# Patient Record
Sex: FEMALE | Race: WHITE | NOT HISPANIC OR LATINO | URBAN - METROPOLITAN AREA
[De-identification: names, ages, dates, MRNs, and addresses within clinical notes are randomized per-mention and may not be internally consistent; named-entity substitution may affect disease eponyms.]

---

## 2021-11-15 LAB
CYTOLOGY CVX/VAG DOC THIN PREP: NORMAL
HPV16+18+45 E6+E7MRNA CVX NAA+PROBE: NEGATIVE

## 2023-06-21 PROBLEM — Z86.73 HISTORY OF TRANSIENT ISCHEMIC ATTACK (TIA): Status: ACTIVE | Noted: 2023-06-21

## 2023-09-26 ENCOUNTER — OFFICE VISIT (OUTPATIENT)
Dept: NEUROLOGY | Facility: CLINIC | Age: 38
End: 2023-09-26
Payer: COMMERCIAL

## 2023-09-26 VITALS
BODY MASS INDEX: 24.34 KG/M2 | DIASTOLIC BLOOD PRESSURE: 62 MMHG | SYSTOLIC BLOOD PRESSURE: 98 MMHG | WEIGHT: 170 LBS | HEART RATE: 61 BPM | HEIGHT: 70 IN | OXYGEN SATURATION: 95 %

## 2023-09-26 DIAGNOSIS — Z86.73 HISTORY OF CEREBRAL INFARCTION: Primary | ICD-10-CM

## 2023-09-26 DIAGNOSIS — G43.109 MIGRAINE WITH AURA AND WITHOUT STATUS MIGRAINOSUS, NOT INTRACTABLE: ICD-10-CM

## 2023-09-26 RX ORDER — DIPHENOXYLATE HYDROCHLORIDE AND ATROPINE SULFATE 2.5; .025 MG/1; MG/1
TABLET ORAL DAILY
COMMUNITY

## 2023-09-26 NOTE — PROGRESS NOTES
Chief Complaint   Patient presents with    Transient Ischemic Attack       Patient ID: Beryl Pro is a 38 y.o. female.    HPI:    Ms. Beryl Pro is a 38-year-old female who was referred by HODA Dunn, for history of transient ischemic attack. She also has seen Dr. Caballero in neurology in the past. She has a history of TIA with no residual deficits in 2020. She was referred to hematology and oncology for full evaluation for causes of no significant findings. It was determined it was likely birth control and could have been ocular migraine and she has ocular migraine occurrences twice yearly. She is on topiramate 50 mg daily, but it was discontinued in the past due to concerns for weight loss. She has a small chronic lacunar infarct in her right thalamus. She has a normal factor V Leiden, normal homocysteine, normal antithrombin III. Cardiac echo with bubble study was normal with no evidence of PFO. CTA of the head and neck was normal except for non-visualization of the upper left vertebral artery. Her MRI of the brain in 06/2020 showed an acute ischemic stroke in the right thalamus, so it was not a TIA, it was a small stroke. Her LDL was 140 mg/dL at one point. Xarelto was prescribed in the past. She takes aspirin 81 mg daily. She takes atorvastatin 20 mg. She has had a history of COVID-19. I do not see data about a cardiac event monitor. She had an A1c done which was low 4.6 percent. Her LDL was 61 mg/dL which is at goal of less than 70 mg/dL.    The patient reports that she had a small stroke in 2020. She states that she had full numbness on the left side of her body. She notes that she has a history of migraines following a concussion and she thought it was the start of one of her migraines. She notes that she had floaters in her eyes at the time. She states that she does not get them much anymore because of the medicine she is on currently. She states that she felt nauseous throughout that day.  "She notes that she was walking her dog, and it happened suddenly. She denies any weakness. She states that she worked from home during the COVID-19 pandemic. She reports that she went to urgent care, and she kept trying to tell them that there was something wrong, but they kept telling her that it was a new migraine she was having. She notes that she was told to go to the emergency room if she still felt weird the next day. She states that she had no visual symptoms. She notes that the numbness lasted through that first day; however, she states that she had some lingering numbness into the second day when she ended up going into the emergency room that afternoon. She notes that she did a presentation, and then went in. She states that it has resolved completely. She notes that the her pinkie fingers, at the tips, will feel numb. She states that it happens once every 3 months. She notes that she has always had really bad circulation. She confirms that she did not have a heart monitor done. She notes that they attributed it to a \"perfect storm\" of a migraine and being on birth control. She denies any stroke symptoms since then. She denies any facial droop, weakness, speech changes, or numbness.     She states that she is still taking aspirin 81 mg daily. She notes that she has infrequent migraines once or twice a year. She states that she has not taken any Excedrin. She is very hesitant to take anything. She states that being on the topiramate has helped her migraines. She denies any unbearable side effects. She states that it has leveled off for concerns about her losing too much weight. She notes that she will track it closely. She states that at the end of the day, it is calories out, and sometimes it causes not like true nausea, but it does change appetite in some ways. She states that some weeks the appetite is not as much there, although several weeks where it is normal. She states that she is happy with her " migraine regimen currently. She denies any trouble walking.    The patient reports that she is right-handed. She denies any family history of immune disease. She states that she is unsure if they checked anything called an antiphospholipid antibody. She notes that she saw Dr. Spencer at [facility]. She states that she was off the Xarelto when she started temporarily. She notes that she did not like Xarelto. She states that she has a history of bruising before the stroke. She notes that she was on both Xarelto and aspirin, and it was worse. She states that once they did the blood work, she was told that she did not need to come off it. She notes that they put her on the lower dosage of Xarelto.    The patient reports that she does not have any children. She states that she has started discussing that possibility, and she would like to know what her risk factors mean for her.     The patient reports that she moved to [place] in 11/2022. She states that she works at the [employer].     The following portions of the patient's history were reviewed and updated as appropriate: allergies, current medications, past family history, past medical history, past social history, past surgical history and problem list.    Review of Systems   Neurological:  Positive for light-headedness. Negative for dizziness, tremors, seizures, syncope, facial asymmetry, speech difficulty, weakness, numbness and headaches.   Psychiatric/Behavioral:  Negative for agitation, behavioral problems, confusion, decreased concentration, dysphoric mood, hallucinations, self-injury, sleep disturbance and suicidal ideas. The patient is not nervous/anxious and is not hyperactive.         Vitals:    09/26/23 0803   BP: 98/62   Pulse: 61   SpO2: 95%       Neurologic Exam     Mental Status   Speech: speech is normal   Level of consciousness: alert    Cranial Nerves     CN II   Visual fields full to confrontation.     CN III, IV, VI   Pupils are equal, round,  and reactive to light.  Extraocular motions are normal.     CN V   Facial sensation intact.     CN VII   Facial expression full, symmetric.     CN VIII   Hearing: intact    CN IX, X   Palate: symmetric    CN XI   Right trapezius strength: normal  Left trapezius strength: normal    CN XII   Tongue: not atrophic  Fasciculations: absent  Tongue deviation: none    Motor Exam   Muscle bulk: normal    Strength   Right deltoid: 5/5  Left deltoid: 5/5  Right biceps: 5/5  Left biceps: 5/5  Right triceps: 5/5  Left triceps: 5/5  Right iliopsoas: 5/5  Left iliopsoas: 5/5  Right quadriceps: 5/5  Left quadriceps: 5/5  Right hamstrin/5  Left hamstrin/5  Right anterior tibial: 5/5  Left anterior tibial: 5/5  Right gastroc: 5/5  Left gastroc: 5/5Grip 5 out of 5 bilaterally     Sensory Exam   Right arm light touch: normal  Left arm light touch: normal  Right leg light touch: normal  Left leg light touch: normal    Gait, Coordination, and Reflexes     Coordination   Finger to nose coordination: normal  Heel to shin coordination: normalNormal unstressed gait     Physical Exam  Eyes:      Extraocular Movements: EOM normal.      Pupils: Pupils are equal, round, and reactive to light.   Neurological:      Coordination: Finger-Nose-Finger Test and Heel to Shin Test normal.   Psychiatric:         Speech: Speech normal.     Procedures    Assessment/Plan:         Diagnoses and all orders for this visit:    1. History of cerebral infarction (Primary)  -     Holter Monitor - 72 Hour Up To 15 Days; Future    2. Migraine with aura and without status migrainosus, not intractable       The patient will complete a cardiac monitor study. She will take ASA 81mg and atorvastatin 20 mg daily. She will start taking riboflavin 400 mg daily 2 days after she discontinues the topiramate. She will continue taking Tylenol as needed. She will establish care with a high-risk OB.    I spent approximately 30 minutes in the care of this patient today. She  has a history of stroke and a history of migraine. I ordered a Holter monitor for further stroke work up.     I do not think that she has ocular migraines. I think that she has migraines with aura, and she has also had a history of stroke. She is doing well on topiramate but is thinking about trying for pregnancy. I discussed with topiramate; I do not recommend that she takes topiramate when she is trying to conceive. She should stop taking it if she decides to try and then can start trying for pregnancy 2 days afterwards. She can start riboflavin 400 mg daily as a preventative and use Tylenol as an abortive for her migraines when she has started to try for pregnancy.    She will follow up with me in 6 months.        Transcribed from ambient dictation for Cecil Pepe MD by Ni aSnderson.  09/26/23   11:11 EDT    Patient or patient representative verbalized consent to the visit recording.  I have personally performed the services described in this document as transcribed by the above individual, and it is both accurate and complete.  Cecil Pepe MD  10/13/2023  09:45 EDT

## 2023-09-26 NOTE — LETTER
October 13, 2023     HODA Dunn  [address]  [address] [address]    Patient: Beryl Pro   YOB: 1985   Date of Visit: 9/26/2023     Dear HODA Dunn:       Thank you for referring Beryl Pro to me for evaluation. Below are the relevant portions of my assessment and plan of care.    If you have questions, please do not hesitate to call me. I look forward to following Beryl along with you.         Sincerely,        Cecil Pepe MD        CC: No Recipients    Cecil Pepe MD  10/13/23 0948  Sign when Signing Visit  Chief Complaint   Patient presents with    Transient Ischemic Attack       Patient ID: Beryl Pro is a 38 y.o. female.    HPI:    Ms. Beryl Pro is a 38-year-old female who was referred by HODA Dunn, for history of transient ischemic attack. She also has seen Dr. Caballero in neurology in the past. She has a history of TIA with no residual deficits in 2020. She was referred to hematology and oncology for full evaluation for causes of no significant findings. It was determined it was likely birth control and could have been ocular migraine and she has ocular migraine occurrences twice yearly. She is on topiramate 50 mg daily, but it was discontinued in the past due to concerns for weight loss. She has a small chronic lacunar infarct in her right thalamus. She has a normal factor V Leiden, normal homocysteine, normal antithrombin III. Cardiac echo with bubble study was normal with no evidence of PFO. CTA of the head and neck was normal except for non-visualization of the upper left vertebral artery. Her MRI of the brain in 06/2020 showed an acute ischemic stroke in the right thalamus, so it was not a TIA, it was a small stroke. Her LDL was 140 mg/dL at one point. Xarelto was prescribed in the past. She takes aspirin 81 mg daily. She takes atorvastatin 20 mg. She has had a history of COVID-19. I do not see data about a cardiac event  "monitor. She had an A1c done which was low 4.6 percent. Her LDL was 61 mg/dL which is at goal of less than 70 mg/dL.    The patient reports that she had a small stroke in 2020. She states that she had full numbness on the left side of her body. She notes that she has a history of migraines following a concussion and she thought it was the start of one of her migraines. She notes that she had floaters in her eyes at the time. She states that she does not get them much anymore because of the medicine she is on currently. She states that she felt nauseous throughout that day. She notes that she was walking her dog, and it happened suddenly. She denies any weakness. She states that she worked from home during the COVID-19 pandemic. She reports that she went to urgent care, and she kept trying to tell them that there was something wrong, but they kept telling her that it was a new migraine she was having. She notes that she was told to go to the emergency room if she still felt weird the next day. She states that she had no visual symptoms. She notes that the numbness lasted through that first day; however, she states that she had some lingering numbness into the second day when she ended up going into the emergency room that afternoon. She notes that she did a presentation, and then went in. She states that it has resolved completely. She notes that the her pinkie fingers, at the tips, will feel numb. She states that it happens once every 3 months. She notes that she has always had really bad circulation. She confirms that she did not have a heart monitor done. She notes that they attributed it to a \"perfect storm\" of a migraine and being on birth control. She denies any stroke symptoms since then. She denies any facial droop, weakness, speech changes, or numbness.     She states that she is still taking aspirin 81 mg daily. She notes that she has infrequent migraines once or twice a year. She states that she has not " taken any Excedrin. She is very hesitant to take anything. She states that being on the topiramate has helped her migraines. She denies any unbearable side effects. She states that it has leveled off for concerns about her losing too much weight. She notes that she will track it closely. She states that at the end of the day, it is calories out, and sometimes it causes not like true nausea, but it does change appetite in some ways. She states that some weeks the appetite is not as much there, although several weeks where it is normal. She states that she is happy with her migraine regimen currently. She denies any trouble walking.    The patient reports that she is right-handed. She denies any family history of immune disease. She states that she is unsure if they checked anything called an antiphospholipid antibody. She notes that she saw Dr. Spencer at [facility]. She states that she was off the Xarelto when she started temporarily. She notes that she did not like Xarelto. She states that she has a history of bruising before the stroke. She notes that she was on both Xarelto and aspirin, and it was worse. She states that once they did the blood work, she was told that she did not need to come off it. She notes that they put her on the lower dosage of Xarelto.    The patient reports that she does not have any children. She states that she has started discussing that possibility, and she would like to know what her risk factors mean for her.     The patient reports that she moved to [place] in 11/2022. She states that she works at the [employer].     The following portions of the patient's history were reviewed and updated as appropriate: allergies, current medications, past family history, past medical history, past social history, past surgical history and problem list.    Review of Systems   Neurological:  Positive for light-headedness. Negative for dizziness, tremors, seizures, syncope, facial asymmetry,  speech difficulty, weakness, numbness and headaches.   Psychiatric/Behavioral:  Negative for agitation, behavioral problems, confusion, decreased concentration, dysphoric mood, hallucinations, self-injury, sleep disturbance and suicidal ideas. The patient is not nervous/anxious and is not hyperactive.         Vitals:    23 0803   BP: 98/62   Pulse: 61   SpO2: 95%       Neurologic Exam     Mental Status   Speech: speech is normal   Level of consciousness: alert    Cranial Nerves     CN II   Visual fields full to confrontation.     CN III, IV, VI   Pupils are equal, round, and reactive to light.  Extraocular motions are normal.     CN V   Facial sensation intact.     CN VII   Facial expression full, symmetric.     CN VIII   Hearing: intact    CN IX, X   Palate: symmetric    CN XI   Right trapezius strength: normal  Left trapezius strength: normal    CN XII   Tongue: not atrophic  Fasciculations: absent  Tongue deviation: none    Motor Exam   Muscle bulk: normal    Strength   Right deltoid: 5/5  Left deltoid: 5/5  Right biceps: 5/5  Left biceps: 5/5  Right triceps: 5/5  Left triceps: 5/5  Right iliopsoas: 5/5  Left iliopsoas: 5/5  Right quadriceps: 5/5  Left quadriceps: 5/5  Right hamstrin/5  Left hamstrin/5  Right anterior tibial: 5/5  Left anterior tibial: 5/5  Right gastroc: 5/5  Left gastroc: 5/5Grip 5 out of 5 bilaterally     Sensory Exam   Right arm light touch: normal  Left arm light touch: normal  Right leg light touch: normal  Left leg light touch: normal    Gait, Coordination, and Reflexes     Coordination   Finger to nose coordination: normal  Heel to shin coordination: normalNormal unstressed gait     Physical Exam  Eyes:      Extraocular Movements: EOM normal.      Pupils: Pupils are equal, round, and reactive to light.   Neurological:      Coordination: Finger-Nose-Finger Test and Heel to Shin Test normal.   Psychiatric:         Speech: Speech normal.     Procedures    Assessment/Plan:          Diagnoses and all orders for this visit:    1. History of cerebral infarction (Primary)  -     Holter Monitor - 72 Hour Up To 15 Days; Future    2. Migraine with aura and without status migrainosus, not intractable       The patient will complete a cardiac monitor study. She will take ASA 81mg and atorvastatin 20 mg daily. She will start taking riboflavin 400 mg daily 2 days after she discontinues the topiramate. She will continue taking Tylenol as needed. She will establish care with a high-risk OB.    I spent approximately 30 minutes in the care of this patient today. She has a history of stroke and a history of migraine. I ordered a Holter monitor for further stroke work up.     I do not think that she has ocular migraines. I think that she has migraines with aura, and she has also had a history of stroke. She is doing well on topiramate but is thinking about trying for pregnancy. I discussed with topiramate; I do not recommend that she takes topiramate when she is trying to conceive. She should stop taking it if she decides to try and then can start trying for pregnancy 2 days afterwards. She can start riboflavin 400 mg daily as a preventative and use Tylenol as an abortive for her migraines when she has started to try for pregnancy.    She will follow up with me in 6 months.        Transcribed from ambient dictation for Cecil Pepe MD by Ni Sanderson.  09/26/23   11:11 EDT    Patient or patient representative verbalized consent to the visit recording.  I have personally performed the services described in this document as transcribed by the above individual, and it is both accurate and complete.  Cecil Pepe MD  10/13/2023  09:45 EDT

## 2023-10-13 DIAGNOSIS — Z86.73 HISTORY OF TRANSIENT ISCHEMIC ATTACK (TIA): ICD-10-CM

## 2023-10-16 RX ORDER — ATORVASTATIN CALCIUM 20 MG/1
20 TABLET, FILM COATED ORAL DAILY
Qty: 90 TABLET | Refills: 0 | Status: CANCELLED | OUTPATIENT
Start: 2023-10-16

## 2023-10-23 ENCOUNTER — TELEPHONE (OUTPATIENT)
Dept: INTERNAL MEDICINE | Age: 38
End: 2023-10-23
Payer: COMMERCIAL

## 2023-10-23 ENCOUNTER — TELEPHONE (OUTPATIENT)
Dept: INTERNAL MEDICINE | Age: 38
End: 2023-10-23

## 2023-10-23 NOTE — TELEPHONE ENCOUNTER
"Message left in [facility] on 10/20/23 at 1858:    \"I am still waiting on a refill for atorvastatin. Called on October 13th requesting and still have not received refill into pharmacy.\"    Per refill encounter, looks like patient should have still had refills available at the time of her inquiry on 10/13/23. Please call patient and discuss.  "

## 2023-10-23 NOTE — TELEPHONE ENCOUNTER
Pt called in checking on refill for atorvastatin. Stated she called a week and a half ago and hasn't heard anything back. Pt is currently out of meds and is about to go on vacation. Call back number [phone].

## 2023-10-24 DIAGNOSIS — Z86.73 HISTORY OF TRANSIENT ISCHEMIC ATTACK (TIA): ICD-10-CM

## 2023-10-24 RX ORDER — ATORVASTATIN CALCIUM 20 MG/1
20 TABLET, FILM COATED ORAL DAILY
Qty: 90 TABLET | Refills: 1 | Status: SHIPPED | OUTPATIENT
Start: 2023-10-24

## 2024-02-29 ENCOUNTER — TELEPHONE (OUTPATIENT)
Dept: FAMILY MEDICINE | Age: 39
End: 2024-02-29

## 2024-02-29 ENCOUNTER — APPOINTMENT (OUTPATIENT)
Dept: FAMILY MEDICINE | Age: 39
End: 2024-02-29

## 2024-02-29 VITALS
SYSTOLIC BLOOD PRESSURE: 95 MMHG | BODY MASS INDEX: 26.22 KG/M2 | DIASTOLIC BLOOD PRESSURE: 63 MMHG | HEIGHT: 69 IN | TEMPERATURE: 98.4 F | HEART RATE: 67 BPM | WEIGHT: 177 LBS

## 2024-02-29 DIAGNOSIS — Z76.89 ENCOUNTER TO ESTABLISH CARE: Primary | ICD-10-CM

## 2024-02-29 DIAGNOSIS — Z00.00 HEALTH MAINTENANCE EXAMINATION: ICD-10-CM

## 2024-02-29 PROBLEM — G43.909 MIGRAINES: Chronic | Status: ACTIVE | Noted: 2020-06-26

## 2024-02-29 PROBLEM — Z86.73 HISTORY OF ISCHEMIC STROKE: Status: ACTIVE | Noted: 2024-02-29

## 2024-02-29 PROBLEM — I63.9 CEREBRAL VASCULAR ACCIDENT (CMD): Status: ACTIVE | Noted: 2020-06-26

## 2024-02-29 PROCEDURE — 99203 OFFICE O/P NEW LOW 30 MIN: CPT

## 2024-02-29 RX ORDER — ATORVASTATIN CALCIUM 20 MG/1
20 TABLET, FILM COATED ORAL DAILY
COMMUNITY
Start: 2023-10-24 | End: 2024-02-29 | Stop reason: SDUPTHER

## 2024-02-29 RX ORDER — ATORVASTATIN CALCIUM 20 MG/1
20 TABLET, FILM COATED ORAL DAILY
Qty: 90 TABLET | Refills: 1 | Status: SHIPPED | OUTPATIENT
Start: 2024-02-29

## 2024-02-29 RX ORDER — TOPIRAMATE 50 MG/1
50 TABLET, FILM COATED ORAL 2 TIMES DAILY
Qty: 90 TABLET | Refills: 1 | Status: SHIPPED | OUTPATIENT
Start: 2024-02-29

## 2024-02-29 RX ORDER — ASPIRIN 81 MG/1
81 TABLET ORAL DAILY
Qty: 90 TABLET | Refills: 1 | Status: SHIPPED | OUTPATIENT
Start: 2024-02-29

## 2024-02-29 RX ORDER — TOPIRAMATE 50 MG/1
50 TABLET, FILM COATED ORAL 2 TIMES DAILY
COMMUNITY
End: 2024-02-29 | Stop reason: SDUPTHER

## 2024-02-29 RX ORDER — ASPIRIN 81 MG/1
81 TABLET ORAL DAILY
COMMUNITY
End: 2024-02-29 | Stop reason: SDUPTHER

## 2024-02-29 ASSESSMENT — PATIENT HEALTH QUESTIONNAIRE - PHQ9
1. LITTLE INTEREST OR PLEASURE IN DOING THINGS: NOT AT ALL
4. FEELING TIRED OR HAVING LITTLE ENERGY: NOT AT ALL
9. THOUGHTS THAT YOU WOULD BE BETTER OFF DEAD, OR OF HURTING YOURSELF: NOT AT ALL
2. FEELING DOWN, DEPRESSED OR HOPELESS: NOT AT ALL
10. IF YOU CHECKED OFF ANY PROBLEMS, HOW DIFFICULT HAVE THESE PROBLEMS MADE IT FOR YOU TO DO YOUR WORK, TAKE CARE OF THINGS AT HOME, OR GET ALONG WITH OTHER PEOPLE: NOT DIFFICULT AT ALL
8. MOVING OR SPEAKING SO SLOWLY THAT OTHER PEOPLE COULD HAVE NOTICED. OR THE OPPOSITE, BEING SO FIGETY OR RESTLESS THAT YOU HAVE BEEN MOVING AROUND A LOT MORE THAN USUAL: NOT AT ALL
SUM OF ALL RESPONSES TO PHQ9 QUESTIONS 1 AND 2: 0
CLINICAL INTERPRETATION OF PHQ2 SCORE: NO FURTHER SCREENING NEEDED
5. POOR APPETITE, WEIGHT LOSS, OR OVEREATING: NOT AT ALL
7. TROUBLE CONCENTRATING ON THINGS, SUCH AS READING THE NEWSPAPER OR WATCHING TELEVISION: NOT AT ALL
SUM OF ALL RESPONSES TO PHQ QUESTIONS 1-9: 0
SUM OF ALL RESPONSES TO PHQ9 QUESTIONS 1 AND 2: 0
6. FEELING BAD ABOUT YOURSELF - OR THAT YOU ARE A FAILURE OR HAVE LET YOURSELF OR YOUR FAMILY DOWN: NOT AT ALL
3. TROUBLE FALLING OR STAYING ASLEEP OR SLEEPING TOO MUCH: NOT AT ALL

## 2024-02-29 ASSESSMENT — ANXIETY QUESTIONNAIRES
IF YOU CHECKED OFF ANY PROBLEMS ON THIS QUESTIONNAIRE, HOW DIFFICULT HAVE THESE PROBLEMS MADE IT FOR YOU TO DO YOUR WORK, TAKE CARE OF THINGS AT HOME, OR GET ALONG WITH OTHER PEOPLE: NOT DIFFICULT AT ALL
4. TROUBLE RELAXING: 0
7. FEELING AFRAID AS IF SOMETHING AWFUL MIGHT HAPPEN: 0
6. BECOMING EASILY ANNOYED OR IRRITABLE: NOT AT ALL
3. WORRYING TOO MUCH ABOUT DIFFERENT THINGS: 0
1. FEELING NERVOUS, ANXIOUS, OR ON EDGE: 0
4. TROUBLE RELAXING: NOT AT ALL
7. FEELING AFRAID AS IF SOMETHING AWFUL MIGHT HAPPEN: NOT AT ALL
1. FEELING NERVOUS, ANXIOUS, OR ON EDGE: NOT AT ALL
5. BEING SO RESTLESS THAT IT IS HARD TO SIT STILL: NOT AT ALL
2. NOT BEING ABLE TO STOP OR CONTROL WORRYING: NOT AT ALL
5. BEING SO RESTLESS THAT IT IS HARD TO SIT STILL: 0
GAD7 TOTAL SCORE: 0
3. WORRYING TOO MUCH ABOUT DIFFERENT THINGS: NOT AT ALL
6. BECOMING EASILY ANNOYED OR IRRITABLE: 0
2. NOT BEING ABLE TO STOP OR CONTROL WORRYING: 0

## 2024-04-23 DIAGNOSIS — Z86.73 HISTORY OF TRANSIENT ISCHEMIC ATTACK (TIA): ICD-10-CM

## 2024-04-23 RX ORDER — ATORVASTATIN CALCIUM 20 MG/1
20 TABLET, FILM COATED ORAL DAILY
Qty: 90 TABLET | Refills: 1 | Status: SHIPPED | OUTPATIENT
Start: 2024-04-23

## 2024-04-25 ENCOUNTER — CLINICAL ABSTRACT (OUTPATIENT)
Dept: CARE COORDINATION | Age: 39
End: 2024-04-25

## 2024-06-01 DIAGNOSIS — Z00.00 HEALTH MAINTENANCE EXAMINATION: ICD-10-CM

## 2024-06-01 RX ORDER — TOPIRAMATE 50 MG/1
50 TABLET, FILM COATED ORAL 2 TIMES DAILY
Qty: 180 TABLET | Refills: 3 | Status: SHIPPED | OUTPATIENT
Start: 2024-06-01

## 2024-06-13 ENCOUNTER — APPOINTMENT (OUTPATIENT)
Dept: OBGYN | Age: 39
End: 2024-06-13

## 2025-04-18 ENCOUNTER — APPOINTMENT (OUTPATIENT)
Dept: INTERNAL MEDICINE | Age: 40
End: 2025-04-18

## 2025-04-18 ENCOUNTER — LAB SERVICES (OUTPATIENT)
Dept: LAB | Age: 40
End: 2025-04-18

## 2025-04-18 VITALS
RESPIRATION RATE: 18 BRPM | DIASTOLIC BLOOD PRESSURE: 66 MMHG | SYSTOLIC BLOOD PRESSURE: 101 MMHG | WEIGHT: 175.2 LBS | BODY MASS INDEX: 25.08 KG/M2 | HEIGHT: 70 IN | HEART RATE: 60 BPM | TEMPERATURE: 97.8 F

## 2025-04-18 DIAGNOSIS — Z00.00 HEALTH MAINTENANCE EXAMINATION: Primary | ICD-10-CM

## 2025-04-18 DIAGNOSIS — Z76.89 ENCOUNTER TO ESTABLISH CARE: ICD-10-CM

## 2025-04-18 DIAGNOSIS — Z86.73 HISTORY OF ISCHEMIC STROKE: ICD-10-CM

## 2025-04-18 DIAGNOSIS — Z23 NEED FOR VACCINATION: ICD-10-CM

## 2025-04-18 DIAGNOSIS — G43.909 MIGRAINE WITHOUT STATUS MIGRAINOSUS, NOT INTRACTABLE, UNSPECIFIED MIGRAINE TYPE: Chronic | ICD-10-CM

## 2025-04-18 DIAGNOSIS — Z00.00 HEALTH MAINTENANCE EXAMINATION: ICD-10-CM

## 2025-04-18 RX ORDER — TOPIRAMATE 50 MG/1
50 TABLET, FILM COATED ORAL DAILY
Status: SHIPPED | COMMUNITY
Start: 2025-04-18

## 2025-04-18 ASSESSMENT — ANXIETY QUESTIONNAIRES
2. NOT BEING ABLE TO STOP OR CONTROL WORRYING: NOT AT ALL
5. BEING SO RESTLESS THAT IT IS HARD TO SIT STILL: 0
1. FEELING NERVOUS, ANXIOUS, OR ON EDGE: NOT AT ALL
7. FEELING AFRAID AS IF SOMETHING AWFUL MIGHT HAPPEN: 0
6. BECOMING EASILY ANNOYED OR IRRITABLE: NOT AT ALL
3. WORRYING TOO MUCH ABOUT DIFFERENT THINGS: NOT AT ALL
5. BEING SO RESTLESS THAT IT IS HARD TO SIT STILL: NOT AT ALL
7. FEELING AFRAID AS IF SOMETHING AWFUL MIGHT HAPPEN: NOT AT ALL
4. TROUBLE RELAXING: NOT AT ALL
IF YOU CHECKED OFF ANY PROBLEMS ON THIS QUESTIONNAIRE, HOW DIFFICULT HAVE THESE PROBLEMS MADE IT FOR YOU TO DO YOUR WORK, TAKE CARE OF THINGS AT HOME, OR GET ALONG WITH OTHER PEOPLE: NOT DIFFICULT AT ALL
1. FEELING NERVOUS, ANXIOUS, OR ON EDGE: 0
6. BECOMING EASILY ANNOYED OR IRRITABLE: 0
3. WORRYING TOO MUCH ABOUT DIFFERENT THINGS: 0
2. NOT BEING ABLE TO STOP OR CONTROL WORRYING: 0
4. TROUBLE RELAXING: 0
GAD7 TOTAL SCORE: 0

## 2025-04-18 ASSESSMENT — ENCOUNTER SYMPTOMS
DIARRHEA: 0
ABDOMINAL PAIN: 0
EYES NEGATIVE: 1
CONSTIPATION: 0
CHILLS: 0
PSYCHIATRIC NEGATIVE: 1
HEADACHES: 0
GASTROINTESTINAL NEGATIVE: 1
FEVER: 0
RESPIRATORY NEGATIVE: 1
CONSTITUTIONAL NEGATIVE: 1
NEUROLOGICAL NEGATIVE: 1

## 2025-04-18 ASSESSMENT — PATIENT HEALTH QUESTIONNAIRE - PHQ9
SUM OF ALL RESPONSES TO PHQ9 QUESTIONS 1 AND 2: 0
2. FEELING DOWN, DEPRESSED OR HOPELESS: NOT AT ALL
CLINICAL INTERPRETATION OF PHQ2 SCORE: NO FURTHER SCREENING NEEDED
SUM OF ALL RESPONSES TO PHQ9 QUESTIONS 1 AND 2: 0
1. LITTLE INTEREST OR PLEASURE IN DOING THINGS: NOT AT ALL

## 2025-04-19 LAB
25(OH)D3+25(OH)D2 SERPL-MCNC: 26.6 NG/ML (ref 30–100)
ALBUMIN SERPL-MCNC: 3.9 G/DL (ref 3.4–5)
ALBUMIN/GLOB SERPL: 1.3 {RATIO} (ref 1–2.4)
ALP SERPL-CCNC: 48 UNITS/L (ref 45–117)
ALT SERPL-CCNC: 16 UNITS/L
ANION GAP SERPL CALC-SCNC: 10 MMOL/L (ref 7–19)
AST SERPL-CCNC: 10 UNITS/L
BILIRUB SERPL-MCNC: 0.4 MG/DL (ref 0.2–1)
BUN SERPL-MCNC: 13 MG/DL (ref 6–20)
BUN/CREAT SERPL: 23 (ref 7–25)
CALCIUM SERPL-MCNC: 8.9 MG/DL (ref 8.4–10.2)
CHLORIDE SERPL-SCNC: 110 MMOL/L (ref 97–110)
CHOLEST SERPL-MCNC: 220 MG/DL
CHOLEST/HDLC SERPL: 2.4 {RATIO}
CO2 SERPL-SCNC: 25 MMOL/L (ref 21–32)
CREAT SERPL-MCNC: 0.56 MG/DL (ref 0.51–0.95)
EGFRCR SERPLBLD CKD-EPI 2021: >90 ML/MIN/{1.73_M2}
FASTING DURATION TIME PATIENT: 13 HOURS (ref 0–999)
GLOBULIN SER-MCNC: 3.1 G/DL (ref 2–4)
GLUCOSE SERPL-MCNC: 81 MG/DL (ref 70–99)
HDLC SERPL-MCNC: 90 MG/DL
LDLC SERPL CALC-MCNC: 118 MG/DL
NONHDLC SERPL-MCNC: 130 MG/DL
POTASSIUM SERPL-SCNC: 4.5 MMOL/L (ref 3.4–5.1)
PROT SERPL-MCNC: 7 G/DL (ref 6.4–8.2)
SODIUM SERPL-SCNC: 140 MMOL/L (ref 135–145)
TRIGL SERPL-MCNC: 61 MG/DL
TSH SERPL-ACNC: 0.4 MCUNITS/ML (ref 0.35–5)

## 2025-04-21 DIAGNOSIS — I63.9 CEREBROVASCULAR ACCIDENT (CVA), UNSPECIFIED MECHANISM  (CMD): Primary | ICD-10-CM

## 2025-04-21 DIAGNOSIS — Z00.00 HEALTH MAINTENANCE EXAMINATION: ICD-10-CM

## 2025-04-21 RX ORDER — ATORVASTATIN CALCIUM 20 MG/1
20 TABLET, FILM COATED ORAL DAILY
Qty: 90 TABLET | Refills: 1 | Status: SHIPPED | OUTPATIENT
Start: 2025-04-21

## 2025-04-22 ENCOUNTER — TELEPHONE (OUTPATIENT)
Dept: FAMILY MEDICINE | Age: 40
End: 2025-04-22

## 2025-06-09 DIAGNOSIS — Z00.00 HEALTH MAINTENANCE EXAMINATION: ICD-10-CM

## 2025-06-16 ENCOUNTER — E-ADVICE (OUTPATIENT)
Dept: FAMILY MEDICINE | Age: 40
End: 2025-06-16

## 2025-06-18 RX ORDER — ASPIRIN 81 MG/1
81 TABLET ORAL DAILY
Qty: 90 TABLET | Refills: 1 | Status: SHIPPED | OUTPATIENT
Start: 2025-06-18

## 2025-07-10 ENCOUNTER — TELEPHONE (OUTPATIENT)
Dept: NEUROLOGY | Age: 40
End: 2025-07-10

## 2025-07-14 ENCOUNTER — TELEPHONE (OUTPATIENT)
Dept: NEUROLOGY | Age: 40
End: 2025-07-14

## 2025-07-14 SDOH — ECONOMIC STABILITY: FOOD INSECURITY: WITHIN THE PAST 12 MONTHS, THE FOOD YOU BOUGHT JUST DIDN'T LAST AND YOU DIDN'T HAVE MONEY TO GET MORE.: NEVER TRUE

## 2025-07-14 SDOH — ECONOMIC STABILITY: HOUSING INSECURITY: DO YOU HAVE PROBLEMS WITH ANY OF THE FOLLOWING?: NONE OF THE ABOVE

## 2025-07-14 SDOH — ECONOMIC STABILITY: HOUSING INSECURITY: WHAT IS YOUR LIVING SITUATION TODAY?: I HAVE A STEADY PLACE TO LIVE

## 2025-07-14 SDOH — ECONOMIC STABILITY: TRANSPORTATION INSECURITY
IN THE PAST 12 MONTHS, HAS LACK OF RELIABLE TRANSPORTATION KEPT YOU FROM MEDICAL APPOINTMENTS, MEETINGS, WORK OR FROM GETTING THINGS NEEDED FOR DAILY LIVING?: NO

## 2025-07-14 ASSESSMENT — SOCIAL DETERMINANTS OF HEALTH (SDOH): IN THE PAST 12 MONTHS, HAS THE ELECTRIC, GAS, OIL, OR WATER COMPANY THREATENED TO SHUT OFF SERVICE IN YOUR HOME?: NO

## 2025-07-15 ENCOUNTER — APPOINTMENT (OUTPATIENT)
Dept: NEUROLOGY | Age: 40
End: 2025-07-15
Attending: NURSE PRACTITIONER

## 2025-07-15 ENCOUNTER — APPOINTMENT (OUTPATIENT)
Dept: FAMILY MEDICINE | Age: 40
End: 2025-07-15

## 2025-07-15 VITALS
WEIGHT: 183.09 LBS | RESPIRATION RATE: 18 BRPM | HEART RATE: 56 BPM | BODY MASS INDEX: 26.27 KG/M2 | TEMPERATURE: 97.7 F | SYSTOLIC BLOOD PRESSURE: 102 MMHG | DIASTOLIC BLOOD PRESSURE: 68 MMHG

## 2025-07-15 VITALS
DIASTOLIC BLOOD PRESSURE: 70 MMHG | HEIGHT: 70 IN | WEIGHT: 185.19 LBS | RESPIRATION RATE: 16 BRPM | TEMPERATURE: 97.5 F | BODY MASS INDEX: 26.51 KG/M2 | HEART RATE: 64 BPM | SYSTOLIC BLOOD PRESSURE: 104 MMHG | OXYGEN SATURATION: 96 %

## 2025-07-15 DIAGNOSIS — R63.5 WEIGHT GAIN: ICD-10-CM

## 2025-07-15 DIAGNOSIS — G43.E09 CHRONIC MIGRAINE WITH AURA WITHOUT STATUS MIGRAINOSUS, NOT INTRACTABLE: Primary | ICD-10-CM

## 2025-07-15 DIAGNOSIS — R20.2 PARESTHESIAS: ICD-10-CM

## 2025-07-15 DIAGNOSIS — R14.0 BLOATING: Primary | ICD-10-CM

## 2025-07-15 DIAGNOSIS — Z00.00 HEALTH MAINTENANCE EXAMINATION: ICD-10-CM

## 2025-07-15 DIAGNOSIS — Z86.73 HISTORY OF ISCHEMIC STROKE: ICD-10-CM

## 2025-07-15 DIAGNOSIS — G43.909 MIGRAINE WITHOUT STATUS MIGRAINOSUS, NOT INTRACTABLE, UNSPECIFIED MIGRAINE TYPE: Chronic | ICD-10-CM

## 2025-07-15 PROCEDURE — 99213 OFFICE O/P EST LOW 20 MIN: CPT | Performed by: NURSE PRACTITIONER

## 2025-07-15 PROCEDURE — 99205 OFFICE O/P NEW HI 60 MIN: CPT | Performed by: PSYCHIATRY & NEUROLOGY

## 2025-07-15 RX ORDER — DICYCLOMINE HYDROCHLORIDE 10 MG/1
10 CAPSULE ORAL 2 TIMES DAILY
Qty: 60 CAPSULE | Refills: 1 | Status: SHIPPED | OUTPATIENT
Start: 2025-07-15

## 2025-07-15 RX ORDER — TOPIRAMATE 50 MG/1
50 TABLET, FILM COATED ORAL DAILY
Qty: 90 TABLET | Refills: 4 | Status: SHIPPED | OUTPATIENT
Start: 2025-07-15

## 2025-07-15 ASSESSMENT — ENCOUNTER SYMPTOMS
ABDOMINAL DISTENTION: 1
PSYCHIATRIC NEGATIVE: 1
UNEXPECTED WEIGHT CHANGE: 1

## 2025-07-15 ASSESSMENT — PATIENT HEALTH QUESTIONNAIRE - PHQ9
SUM OF ALL RESPONSES TO PHQ9 QUESTIONS 1 AND 2: 0
SUM OF ALL RESPONSES TO PHQ9 QUESTIONS 1 AND 2: 0
2. FEELING DOWN, DEPRESSED OR HOPELESS: NOT AT ALL
CLINICAL INTERPRETATION OF PHQ2 SCORE: NO FURTHER SCREENING NEEDED
1. LITTLE INTEREST OR PLEASURE IN DOING THINGS: NOT AT ALL

## 2025-07-15 ASSESSMENT — PAIN SCALES - GENERAL: PAINLEVEL_OUTOF10: 0

## 2025-08-22 ENCOUNTER — APPOINTMENT (OUTPATIENT)
Dept: MAMMOGRAPHY | Age: 40
End: 2025-08-22

## 2025-09-03 ENCOUNTER — HOSPITAL ENCOUNTER (OUTPATIENT)
Dept: ULTRASOUND IMAGING | Age: 40
Discharge: HOME | End: 2025-09-03

## 2025-09-03 DIAGNOSIS — R14.0 BLOATING: ICD-10-CM

## 2025-09-03 PROCEDURE — 76830 TRANSVAGINAL US NON-OB: CPT

## 2026-07-07 ENCOUNTER — APPOINTMENT (OUTPATIENT)
Dept: NEUROLOGY | Age: 41
End: 2026-07-07